# Patient Record
Sex: MALE | Race: WHITE
[De-identification: names, ages, dates, MRNs, and addresses within clinical notes are randomized per-mention and may not be internally consistent; named-entity substitution may affect disease eponyms.]

---

## 2018-12-23 ENCOUNTER — HOSPITAL ENCOUNTER (OUTPATIENT)
Dept: HOSPITAL 97 - ER | Age: 83
Setting detail: OBSERVATION
LOS: 1 days | Discharge: HOME | End: 2018-12-24
Attending: INTERNAL MEDICINE | Admitting: INTERNAL MEDICINE
Payer: COMMERCIAL

## 2018-12-23 DIAGNOSIS — Z79.82: ICD-10-CM
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
## 2018-12-23 DIAGNOSIS — I25.10: ICD-10-CM

## 2018-12-23 DIAGNOSIS — Z95.1: ICD-10-CM

## 2018-12-23 DIAGNOSIS — S70.01XA: Primary | ICD-10-CM

## 2018-12-23 LAB
HCT VFR BLD CALC: 36.6 % (ref 39.6–49)
LYMPHOCYTES # SPEC AUTO: 1.6 K/UL (ref 0.7–4.9)
PMV BLD: 8 FL (ref 7.6–11.3)
RBC # BLD: 3.83 M/UL (ref 4.33–5.43)

## 2018-12-23 PROCEDURE — 87186 SC STD MICRODIL/AGAR DIL: CPT

## 2018-12-23 PROCEDURE — 87088 URINE BACTERIA CULTURE: CPT

## 2018-12-23 PROCEDURE — 81015 MICROSCOPIC EXAM OF URINE: CPT

## 2018-12-23 PROCEDURE — 80048 BASIC METABOLIC PNL TOTAL CA: CPT

## 2018-12-23 PROCEDURE — 81003 URINALYSIS AUTO W/O SCOPE: CPT

## 2018-12-23 PROCEDURE — 85025 COMPLETE CBC W/AUTO DIFF WBC: CPT

## 2018-12-23 PROCEDURE — 72170 X-RAY EXAM OF PELVIS: CPT

## 2018-12-23 PROCEDURE — 87086 URINE CULTURE/COLONY COUNT: CPT

## 2018-12-23 PROCEDURE — 36415 COLL VENOUS BLD VENIPUNCTURE: CPT

## 2018-12-23 PROCEDURE — 99285 EMERGENCY DEPT VISIT HI MDM: CPT

## 2018-12-23 PROCEDURE — 73502 X-RAY EXAM HIP UNI 2-3 VIEWS: CPT

## 2018-12-23 PROCEDURE — 87077 CULTURE AEROBIC IDENTIFY: CPT

## 2018-12-23 NOTE — EDPHYS
Physician Documentation                                                                           

 [facility]                                                                

Name: Farhan Garcia                                                                                

Age: 89 yrs                                                                                       

Sex: Male                                                                                         

: 1929                                                                                   

MRN: [id number]                                                                                   

Arrival Date: 2018                                                                          

Time: 20:16                                                                                       

Account#: [id number]                                                                            

Bed 20                                                                                            

Private MD: CHAVO Galindo C                                                                            

ED Physician Varinder Goel                                                                             

HPI:                                                                                              

                                                                                             

21:12 This 89 yrs old  Male presents to ER via Wheelchair with complaints of Fall    pkl 

      Injury.                                                                                     

21:12 The patient or guardian reports deformity, an injury, pain, swelling. sustained from a  pkl 

      fall, from a standing position, tripped off a curb. The complaints affect the right         

      hip. Onset: The symptoms/episode began/occurred just prior to arrival, 2 hour(s) ago.       

      Associated signs and symptoms: Loss of consciousness: the patient experienced no loss       

      of consciousness.                                                                           

                                                                                                  

Historical:                                                                                       

- Allergies:                                                                                      

20:19 No Known Allergies;                                                                     la1 

- PMHx:                                                                                           

20:19 asbestosis; Hypertension; Osteoporosis;                                                 la1 

- PSHx:                                                                                           

20:19 right hip; CABG; Appendectomy;                                                          la1 

                                                                                                  

- Immunization history:: Adult Immunizations up to date.                                          

- Social history:: Smoking status: Patient/guardian denies using tobacco.                         

- Ebola Screening: : No symptoms or risks identified at this time.                                

                                                                                                  

                                                                                                  

ROS:                                                                                              

21:12 Eyes: Negative for injury, pain, redness, and discharge, ENT: Negative for injury,      pkl 

      pain, and discharge, Neck: Negative for injury, pain, and swelling, Cardiovascular:         

      Negative for chest pain, palpitations, and edema, Respiratory: Negative for shortness       

      of breath, cough, wheezing, and pleuritic chest pain, Abdomen/GI: Negative for              

      abdominal pain, nausea, vomiting, diarrhea, and constipation, Back: Negative for injury     

      and pain, : Negative for injury, bleeding, discharge, and swelling.                       

21:12 MS/extremity: Positive for injury or acute deformity, pain, swelling, tenderness, of        

      the right  hip.                                                                             

21:12 Skin: Negative for rash.                                                                    

21:12 Neuro: Negative for altered mental status, loss of consciousness.                           

                                                                                                  

Exam:                                                                                             

21:12 Head/Face:  Normocephalic, atraumatic. Eyes:  Pupils equal round and reactive to light, pkl 

      extra-ocular motions intact.  Lids and lashes normal.  Conjunctiva and sclera are           

      non-icteric and not injected.  Cornea within normal limits.  Periorbital areas with no      

      swelling, redness, or edema. ENT:  Nares patent. No nasal discharge, no septal              

      abnormalities noted.  Tympanic membranes are normal and external auditory canals are        

      clear.  Oropharynx with no redness, swelling, or masses, exudates, or evidence of           

      obstruction, uvula midline.  Mucous membranes moist. Neck:  Trachea midline, no             

      thyromegaly or masses palpated, and no cervical lymphadenopathy.  Supple, full range of     

      motion without nuchal rigidity, or vertebral point tenderness.  No Meningismus.             

      Chest/axilla:  Normal chest wall appearance and motion.  Nontender with no deformity.       

      No lesions are appreciated. Cardiovascular:  Regular rate and rhythm with a normal S1       

      and S2.  No gallops, murmurs, or rubs.  Normal PMI, no JVD.  No pulse deficits.             

      Respiratory:  Lungs have equal breath sounds bilaterally, clear to auscultation and         

      percussion.  No rales, rhonchi or wheezes noted.  No increased work of breathing, no        

      retractions or nasal flaring. Abdomen/GI:  Soft, non-tender, with normal bowel sounds.      

      No distension or tympany.  No guarding or rebound.  No evidence of tenderness               

      throughout. Back:  No spinal tenderness.  No costovertebral tenderness.  Full range of      

      motion. Skin:  Warm, dry with normal turgor.  Normal color with no rashes, no lesions,      

      and no evidence of cellulitis. Neuro:  Awake and alert, GCS 15, oriented to person,         

      place, time, and situation.  Cranial nerves II-XII grossly intact.  Motor strength 5/5      

      in all extremities.  Sensory grossly intact.  Cerebellar exam normal.  Normal gait.         

21:12 Musculoskeletal/extremity: Extremities: grossly normal except: noted in the right  hip:     

      pain, swelling, tenderness.                                                                 

                                                                                                  

Vital Signs:                                                                                      

20:19  / 66; Pulse 74; Resp 16; Temp 97.5; Pulse Ox 98% on R/A; Weight 88.9 kg; Height  la1 

      5 ft. 8 in. (172.72 cm); Pain 5/10;                                                         

21:00  / 65; Pulse 70; Resp 17; Pulse Ox 99% ;                                          rr5 

22:00  / 71; Pulse 79; Resp 18; Pulse Ox 99% ;                                          rr5 

23:00  / 62; Pulse 79; Resp 17; Pulse Ox 98% ;                                          rr5 

                                                                                             

00:00  / 60; Pulse 77; Resp 17; Temp 97.6; Pulse Ox 98% on R/A;                         rr5 

                                                                                             

20:19 Body Mass Index 29.80 (88.90 kg, 172.72 cm)                                             la1 

                                                                                                  

Barton Coma Score:                                                                               

                                                                                             

20:20 Eye Response: spontaneous(4). Verbal Response: oriented(5). Motor Response: obeys       rr5 

      commands(6). Total: 15.                                                                     

                                                                                                  

Trauma Score (Adult):                                                                             

20:20 Eye Response: spontaneous(1); Verbal Response: oriented(1); Motor Response: obeys       rr5 

      commands(2); Systolic BP: > 89 mm Hg(4); Respiratory Rate: 10 to 29 per min(4); Radha     

      Score: 15; Trauma Score: 12                                                                 

                                                                                                  

MDM:                                                                                              

21:03 Patient medically screened.                                                             pkl 

23:04 Data reviewed: vital signs, nurses notes, lab test result(s), radiologic studies, plain pkl 

      films.                                                                                      

                                                                                                  

                                                                                             

22:45 Order name: CBC with Automated Diff; Complete Time: 02:27                               EDMS

                                                                                             

21:10 Order name: Hip Right 2 View XRAY                                                       pkl 

                                                                                             

21:10 Order name: Pelvis XRAY; Complete Time: 22:50                                           pkl 

                                                                                                  

Administered Medications:                                                                         

No medications were administered                                                                  

                                                                                                  

                                                                                                  

Disposition:                                                                                      

18 23:05 Hospitalization ordered by Deshawn Munoz for Observation. Preliminary            

  diagnosis is Large hematoma right hip. S/P Fall.                                                

- Bed requested for Telemetry/MedSurg (observation).                                              

- Status is Observation.                                                                      rr5 

- Condition is Stable.                                                                            

- Problem is new.                                                                                 

- Symptoms are unchanged.                                                                         

UTI on Admission? No                                                                              

                                                                                                  

                                                                                                  

                                                                                                  

Signatures:                                                                                       

Dispatcher [employer]                           EDMS                                                 

Varinder Goel MD MD   pkl                                                  

Mathew Payan RN                         RN   la1                                                  

Becky Wallace RN                       RN                                                      

Peña Vogel RN                      RN   rr5                                                  

                                                                                                  

Corrections: (The following items were deleted from the chart)                                    

23:51 23:05 Hospitalization Ordered by Deshawn Munoz MD for Observation. Preliminary         cg  

      diagnosis is Large hematoma right hip. S/P Fall. Bed requested for Telemetry/MedSurg        

      (observation). Status is Observation. Condition is Stable. Problem is new. Symptoms are     

      unchanged. UTI on Admission? No. pkl                                                        

                                                                                             

00:59  23:51 2018 23:05 Hospitalization Ordered by Deshawn Munoz MD for           rr5 

      Observation. Preliminary diagnosis is Large hematoma right hip. S/P Fall. Bed requested     

      for Telemetry/MedSurg (observation). Status is Observation. Condition is Stable.            

      Problem is new. Symptoms are unchanged. UTI on Admission? No. cg                            

                                                                                                  

**************************************************************************************************

## 2018-12-23 NOTE — XMS REPORT
Patient Summary Document

 Created on:2018



Patient:BRAXTON KHAN

Sex:Male

:1929

External Reference #:[id number]





Demographics







 Address  [address] [place] [id number]

 

 Home Phone  [phone]

 

 Preferred Language  Unknown

 

 Marital Status  Unknown

 

 Muslim Affiliation  Unknown

 

 Race  Unknown

 

 Additional Race(s)  Unavailable

 

 Ethnic Group  Unknown









Author







 Organization  [facility]

 

 Address  [address] [place] [address] [address]



   [place] [address]

 

 Phone  [phone]









Care Team Providers







 Name  Role  Phone

 

 LEX DOBBS  Unavailable  Unavailable









Problems

This patient has no known problems.



Allergies, Adverse Reactions, Alerts

This patient has no known allergies or adverse reactions.



Medications

This patient has no known medications.



Results







 Test Description  Test Time  Test Comments  Text Results  Atomic Results  
Result Comments









 URINALYSIS W/ REFLEX URINE CULTURE  2017-10-27 15:25:00    









   Test Item  Value  Reference Range  Comments









 COLOR (BEAKER) (test code=470)  Light Yellow    

 

 CLARITY (BEAKER) (test code=469)  Clear    

 

 SPECIFIC GRAVITY UA (BEAKER) (test code=468)  1.004  1.001-1.035  

 

 PH UA (BEAKER) (test code=467)  7.0  5.0-8.0  

 

 PROTEIN UA (BEAKER) (test code=464)  Negative  Negative  

 

 GLUCOSE UA (BEAKER) (test code=365)  Negative  Negative  

 

 KETONES UA (BEAKER) (test code=371)  Negative  Negative  

 

 BILIRUBIN UA (BEAKER) (test code=462)  Negative  Negative  

 

 BLOOD UA (BEAKER) (test code=461)  Moderate  Negative  

 

 NITRITE UA (BEAKER) (test code=465)  Negative  Negative  

 

 LEUKOCYTE ESTERASE UA (BEAKER) (test code=466)  Large  Negative  

 

 UROBILINOGEN UA (BEAKER) (test code=463)  0.2 mg/dL  0.2-1.0  

 

 RBC UA (BEAKER) (test code=519)  16 /HPF    

 

 WBC UA (BEAKER) (test code=520)  30 /HPF    

 

 MUCUS (BEAKER) (test code=1574)  Rare    

 

 SOURCE(BEAKER) (test code=2791)

## 2018-12-23 NOTE — ER
Nurse's Notes                                                                                     

 [facility]                                                                

Name: Farhan Garcia                                                                                

Age: 89 yrs                                                                                       

Sex: Male                                                                                         

: 1929                                                                                   

MRN: [id number]                                                                                   

Arrival Date: 2018                                                                          

Time: 20:16                                                                                       

Account#: [id number]                                                                            

Bed 20                                                                                            

Private MD: CHAVO Galindo C                                                                            

Diagnosis: Large hematoma right hip. S/P Fall                                                     

                                                                                                  

Presentation:                                                                                     

                                                                                             

20:17 Presenting complaint: Patient states: About 2 hours ago I tripped down a curb and       la1 

      landed on my right hip, I have had it replaced in the past and it is very swollen and       

      painful. Transition of care: patient was not received from another setting of care.         

      Onset of symptoms was 2018. Risk Assessment: Do you want to hurt yourself      

      or someone else? Patient reports no desire to harm self or others. Initial Sepsis           

      Screen: Does the patient meet any 2 criteria? No. Patient's initial sepsis screen is        

      negative. Does the patient have a suspected source of infection? No. Patient's initial      

      sepsis screen is negative. Care prior to arrival: None.                                     

20:17 Method Of Arrival: Wheelchair                                                           la1 

20:17 Acuity: ANGELA 3                                                                           la1 

20:17 Mechanism of Injury: Fall.                                                              rr5 

                                                                                                  

Historical:                                                                                       

- Allergies:                                                                                      

20:19 No Known Allergies;                                                                     la1 

- PMHx:                                                                                           

20:19 asbestosis; Hypertension; Osteoporosis;                                                 la1 

- PSHx:                                                                                           

20:19 right hip; CABG; Appendectomy;                                                          la1 

                                                                                                  

- Immunization history:: Adult Immunizations up to date.                                          

- Social history:: Smoking status: Patient/guardian denies using tobacco.                         

- Ebola Screening: : No symptoms or risks identified at this time.                                

                                                                                                  

                                                                                                  

Screenin:20 Abuse screen: Denies threats or abuse. Denies injuries from another. Nutritional        rr5 

      screening: No deficits noted. Tuberculosis screening: No symptoms or risk factors           

      identified.                                                                                 

21:20 Fall Risk Fall in past 12 months (25 points). Secondary diagnosis (15 points) impaired  rr5 

      mobility, IV access (20 points). Total Goldsmith Fall Scale indicates High Risk Score (45       

      or more points). Fall prevention measures have been instituted. Side Rails Up X 2           

      Frequent Obs/Assessments Occuring Family Present and informed to notify staff if the        

      need to leave the bedside As available patient and family educated on Fall Prevention       

      Program and Strategies.                                                                     

                                                                                                  

Primary Survey:                                                                                   

20:20 NO uncontrolled hemorrhage observed. A: The patient is alert. Airway:.                  rr5 

20:20 Breathing/Chest: Respiratory pattern: regular, Respiratory effort: spontaneous,         rr5 

      unlabored. Circulation: Heart tones present. Pulses: palpable right popliteal artery,       

      right posterior tibial artery and right dorsalis pedis artery. Disability Alert.            

      Exposure/Environment: All clothing and personal items were removed. Forensic evidence       

      collection is not deemed to be indicated at this time. Items placed in patient              

      belonging bag.                                                                              

21:00 Reassessment Airway Airway Patent Breathing/Chest Respiratory pattern Regular           rr5 

      Respiratory effort Spontaneous Unlabored Circulation Pulses Palpable Color Pink             

      Disability Alert.                                                                           

21:00 Reassessment Airway Airway Patent Breathing/Chest Respiratory pattern Regular           rr5 

      Respiratory effort Spontaneous Unlabored Circulation Pulses Palpable Color Pink             

      Disability Alert.                                                                           

                                                                                                  

Secondary Survey:                                                                                 

20:20 HEENT: No deficits noted. Gastrointestinal: No deficits noted. : No deficits noted.   rr5 

      Musculoskeletal: Swelling present in right hip.                                             

                                                                                                  

Assessment:                                                                                       

20:17 General: Appears in no apparent distress. uncomfortable, Behavior is calm, cooperative, rr5 

      appropriate for age.                                                                        

20:17 Pain: Complains of pain in right hip Pain does not radiate. Pain currently is 4 out of  rr5 

      10 on a pain scale. Quality of pain is described as aching, Pain began suddenly, Is         

      intermittent. Neuro: Level of Consciousness is awake, alert, obeys commands, Oriented       

      to person, place, time, situation. Cardiovascular: Capillary refill < 3 seconds             

      Patient's skin is warm and dry. Respiratory: Airway is patent Respiratory effort is         

      even, unlabored, Respiratory pattern is regular, symmetrical. GI: Abdomen is round. :     

      No signs and/or symptoms were reported regarding the genitourinary system. EENT: No         

      signs and/or symptoms were reported regarding the EENT system. Derm: Bruising that is       

      dark purple, on right arm and left arm. Musculoskeletal: Swelling present in right hip.     

21:30 Reassessment: Patient appears in no apparent distress at this time. Patient and/or      rr5 

      family updated on plan of care and expected duration. Pain level reassessed. Patient        

      states symptoms have improved.                                                              

22:45 Reassessment: Patient appears in no apparent distress at this time. Patient and/or      rr5 

      family updated on plan of care and expected duration. Pain level reassessed. blood          

      extracted. no complaints made. dr. su explained the plan of admission.                     

23:05 Reassessment: dr. pinto came, assess and examined the patient.                       rr5 

                                                                                             

00:05 Reassessment: Patient appears in no apparent distress at this time. no complaints made, rr5 

      chatting with his  Patient states feeling better. Patient states symptoms have     

      improved.                                                                                   

                                                                                                  

Vital Signs:                                                                                      

                                                                                             

20:19  / 66; Pulse 74; Resp 16; Temp 97.5; Pulse Ox 98% on R/A; Weight 88.9 kg; Height  la1 

      5 ft. 8 in. (172.72 cm); Pain 5/10;                                                         

21:00  / 65; Pulse 70; Resp 17; Pulse Ox 99% ;                                          rr5 

22:00  / 71; Pulse 79; Resp 18; Pulse Ox 99% ;                                          rr5 

23:00  / 62; Pulse 79; Resp 17; Pulse Ox 98% ;                                          rr5 

                                                                                             

00:00  / 60; Pulse 77; Resp 17; Temp 97.6; Pulse Ox 98% on R/A;                         rr5 

                                                                                             

20:19 Body Mass Index 29.80 (88.90 kg, 172.72 cm)                                             la1 

                                                                                                  

Radha Coma Score:                                                                               

                                                                                             

20:20 Eye Response: spontaneous(4). Verbal Response: oriented(5). Motor Response: obeys       rr5 

      commands(6). Total: 15.                                                                     

                                                                                                  

Trauma Score (Adult):                                                                             

20:20 Eye Response: spontaneous(1); Verbal Response: oriented(1); Motor Response: obeys       rr5 

      commands(2); Systolic BP: > 89 mm Hg(4); Respiratory Rate: 10 to 29 per min(4); Anna     

      Score: 15; Trauma Score: 12                                                                 

                                                                                                  

ED Course:                                                                                        

20:16 Patient arrived in ED.                                                                  mr  

20:16 CHAVO Galindo MD is Private Physician.                                                       mr  

20:18 Triage completed.                                                                       la1 

20:19 Arm band placed on right wrist.                                                         la1 

20:20 Pulse ox on. NIBP on.                                                                   rr5 

20:20 Patient has correct armband on for positive identification. Placed in gown. Bed in low  rr5 

      position. Call light in reach. Side rails up X2.                                            

21:03 Varinder Su MD is Attending Physician.                                                    pkl 

21:27 Peña Vogel, RN is Primary Nurse.                                                    rr5 

21:41 Hip Right 2 View XRAY In Process Unspecified.                                           EDMS

21:41 Pelvis XRAY In Process Unspecified.                                                     EDMS

22:00 Patient maintains SpO2 saturation greater than 95% on room air.                         rr5 

22:30 No provider procedures requiring assistance completed.                                  rr5 

23:04 Deshawn Munoz MD is Hospitalizing Provider.                                          pkl 

23:23 CBC with Automated Diff Sent.                                                           rr5 

23:23 Inserted saline lock: 20 gauge in right forearm, using aseptic technique. Blood         rr5 

      collected.                                                                                  

                                                                                             

00:11 Patient admitted, IV remains in place. intact.                                          rr5 

                                                                                                  

Administered Medications:                                                                         

No medications were administered                                                                  

                                                                                                  

                                                                                                  

Output:                                                                                           

                                                                                             

23:30 Urine: 400ml (Voided); Total: 400ml.                                                    rr5 

                                                                                                  

Outcome:                                                                                          

23:05 Decision to Hospitalize by Provider.                                                    pkl 

23:05 Patient's length of stay in the Emergency Department was greater than 2 hours. for      rr5 

      admission. waiting for attending physician approval.Patient's length of stay extended       

      due to                                                                                      

                                                                                             

00:10 Admitted to Tele accompanied by tech, via stretcher, with chart, Report called to       rr5 

      zeferino                                                                                       

      Condition: stable                                                                           

      Instructed on the need for admit.                                                           

00:59 Patient left the ED.                                                                    rr5 

                                                                                                  

Signatures:                                                                                       

Dispatcher [employer]                           EDVarinder Wood MD MD pkl Rivera, Mary                                 mr                                                   

Mathew Payan, RN                         RN   la1                                                  

Peña Vogel, RN                      RN   rr5                                                  

                                                                                                  

**************************************************************************************************

## 2018-12-23 NOTE — RAD REPORT
EXAM DESCRIPTION:  RAD - Pelvis - 12/23/2018 9:41 pm

 

CLINICAL HISTORY:  fall

Trauma, fall, right hip pain

 

COMPARISON:  None

 

FINDINGS:  AP pelvis and right hip-multiple projections are submitted.

 

Right total hip arthroplasty is noted. Large amount of soft tissue swelling is seen about the right h
ip. An acute fracture is not identified.

## 2018-12-23 NOTE — XMS REPORT
Clinical Summary

 Created on:2018



Patient:Farhan Garcia

Sex:Male

:1929

External Reference #:[id number]





Demographics







 Address  [address] [place] [address]

 

 Home Phone  [phone]

 

 Preferred Language  English

 

 Marital Status  Unknown

 

 Mandaen Affiliation  Unknown

 

 Race  White

 

 Ethnic Group  Not  or 









Author







 Organization  [facility]

 

 Address  [address] [place] [address]









Support







 Name  Relationship  Address  Phone

 

 Farhan Garcia  Unavailable  [address] [facility]  [phone]



     [place] [address]  









Care Team Providers







 Name  Role  Phone

 

 Vangie  Primary Care Provider  [phone]









Allergies

No Known Allergies



Medications

Not on file



Active Problems

Not on file



Social History







 Tobacco Use  Types  Packs/Day  Years Used  Date

 

 Never Assessed        









 Sex Assigned at Birth  Date Recorded

 

 Not on file  









 Job Start Date  Occupation  Industry

 

 Not on file  Not on file  Not on file









 Travel History  Travel Start  Travel End









 No recent travel history available.







Last Filed Vital Signs

Not on file



Plan of Treatment

Not on file



Results

Not on fileafter 2017



Insurance







 Payer  Benefit Plan /  Subscriber ID  Type  Phone  Address



   Group        

 

 [employer] - MEDICARE  [employer] O  [address]    [phone]  [address]







 [employer]  POS PPO        [place]



           [address]









 Guarantor Name  Account Type  Relation to  Date of  Phone  Billing Address



     Patient  Birth    

 

 Farhan Garcia  Personal/Famil  Self  1929  [phone]  [address] [place] [address]  [place]      (Home)  [place] [address]

## 2018-12-24 LAB
BUN BLD-MCNC: 17 MG/DL (ref 7–18)
GLUCOSE SERPLBLD-MCNC: 108 MG/DL (ref 74–106)
HCT VFR BLD CALC: 33.2 % (ref 39.6–49)
LYMPHOCYTES # SPEC AUTO: 1.6 K/UL (ref 0.7–4.9)
PMV BLD: 8 FL (ref 7.6–11.3)
POTASSIUM SERPL-SCNC: 4.2 MMOL/L (ref 3.5–5.1)
RBC # BLD: 3.48 M/UL (ref 4.33–5.43)
UA COMPLETE W REFLEX CULTURE PNL UR: (no result)
UA DIPSTICK W REFLEX MICRO PNL UR: (no result)

## 2018-12-24 NOTE — CON
Date of Consultation:  12/24/2018



Brief History Of Present Illness:  The patient is an 89-year-old  male, who has a history of
 coronary artery disease and a right hip replacement many years ago, who fell earlier yesterday over 
the area of the right hip.  Immediately, he started feeling pain and so noticed progressive swelling 
in the area and it had gotten somewhat bruised in appearance.  He has been taking an 81 mg aspirin, b
ut no other anticoagulation.  He was seen in the ER with the significant amount of swelling and pain 
to this area.  Ultimately, his hemoglobin was 12.3 on check and he had no additional distal complaint
s with respect to numbness, tingling, or any sensory or motor deficits in that same said extremity.



Past Medical History:  Coronary artery disease, asbestosis, osteoporosis of right hip.



Past Surgical History:  Right hip arthroscopy, CABG, and an appendectomy.



Family History:  Reviewed and noncontributory.



Social History:  He denies smoking, alcohol, recreational drug use.



Allergies:  NO KNOWN DRUG ALLERGIES.



Home Medications:  Include aspirin 81 mg, atorvastatin, diltiazem, Colace, Lasix, Combivent, ________
__ probiotic, Linzess, melatonin, MiraLax, Folgard, and Ultram.



Review of Systems:

A 10-point review of systems other than HPI, denies.



Physical Examination:

Vital Signs:  At the time of my examination, his BMI is 29.8.  Blood pressure 128/56, respiratory rat
e is 20, pulse is 81, temperature 97.3. 

General:  He is awake, alert, and oriented. 

Psychiatric:  He is appropriately conversive. 

HEENT:  Normocephalic.  Sclerae icteric.  Mucous membranes are moist.  Oropharynx clear. 

Neck:  Supple.  No JVD. 

Chest:  Normal expansion and excursion. 

Cardiovascular:  Regular rate and rhythm.  Midline sternotomy scar. 

Pulmonary:  Clear to auscultation bilaterally. 

Abdomen:  Soft, nontender, and nondistended. 

Extremities:  Focused examination of the right lower extremity shows a large hematoma in the right hi
p area, but it appears stable at this time.  It has minimal tenderness to the area.  There is no skin
 compromise or elevated cellulitis.  There is some bruising and ecchymotic changes to the area.  The 
remainder of the extremity examination shows no sensorineural deficits. 

Skin:  Warm and dry, otherwise.



Laboratory Data:  Reveals a white blood count of 6.3, hemoglobin is 11.3 down from 12.3 on admission,
 his hematocrit is 33.2, his platelets are 160, neutrophils are normal at 59%.  His sodium 138, potas
sium 4.2, chloride 102, carbon dioxide 32, BUN 17, creatinine 0.6, glucose is 108, calcium 8.2.  His 
UA showed positive nitrites, trace leukocyte esterase, 5-10 white blood cells, and loaded bacteria.  
He had imaging performed which included a pelvis x-ray, which was officially read as right total hip 
arthroscopy noted, large amount of soft tissue swelling seen about the right hip.  An acute fracture 
is not identified.



Assessment And Plan:  This is an 89-year-old male who is status post fall with a likely hematoma of t
he right hip area.  It appears stable at this time.  His hemoglobin has not significantly dropped sin
ce admission and with the IV fluids he has received this is likely dilutional.  As such, I recommend 
ice packs and wrapping with a loose Ace bandage pelvic binder and continued outpatient followup.  I h
ave explained the risks, benefits, and alternatives of the above stated plan.  The patient agrees to 
proceed as indicated.  Hold all anticoagulation for 7 days and then resume his 81 mg aspirin at that 


point.  He can follow up with me in the clinic as an outpatient.  We have discussed this as well.





ASTRID/AMMY

DD:  12/24/2018 13:48:11Voice ID:  [id number]

DT:  12/24/2018 18:03:25Report ID:  [id number]

## 2018-12-24 NOTE — P.HP
Certification for Inpatient


Patient admitted to: Observation


With expected LOS: <2 Midnights


Practitioner: I am a practitioner with admitting privileges, knowledge of 

patient current condition, hospital course, and medical plan of care.


Services: Services provided to patient in accordance with Admission 

requirements found in Title 42 Section 412.3 of the Code of Federal Regulations





Patient History


Date of Service: 12/23/18


Reason for admission: right hip hematoma


History of Present Illness: 





Mr Garcia is an 89 years old male with history of CAD, right hip replacement, 

who fell this afternoon, over his right hip. immediately, he start feeling pain

, and noticed progressive swelling in the area. He is taking baby aspirin but 

not anticoagulants. In ER XR was remarkable for large amount of soft tissue 

swelling, no fractures or dislocation. Hgb 12.3. He has significant swelling 

area on his right hip. The patient denied numbness or tingling in his distal 

lower extremity.


Allergies





No Known Aller Allergy (Uncoded 09/05/17 17:32)


 Unknown





Home medications list reviewed: Yes





- Past Medical/Surgical History


-: CAD


-: asbestosis


-: osteoporosis


-: Right hip arthroscopy


-: CABG


-: appex





- Family History


Family History: Reviewed- Non-Contributory





- Social History


Alcohol use: No


CD- Drugs: No


Place of Residence: Home





Review of Systems


10-point ROS is otherwise unremarkable





Physical Examination





- Physical Exam


General: Alert, In no apparent distress


HEENT: Atraumatic, PERRLA, Mucous membr. moist/pink, EOMI, Sclerae nonicteric


Neck: Supple, 2+ carotid pulse no bruit, No LAD, Without JVD or thyroid 

abnormality


Respiratory: Clear to auscultation bilaterally, Normal air movement


Cardiovascular: Regular rate/rhythm, Normal S1 S2


Gastrointestinal: Normal bowel sounds, No tenderness


Musculoskeletal: Swelling (right hip), Tenderness (right hip)


Integumentary: No rashes


Neurological: Normal speech, Normal strength at 5/5 x4 extr, Normal tone, 

Normal affect


Lymphatics: No axilla or inguinal lymphadenopathy





- Studies


Laboratory Data (last 24 hrs)





12/23/18 23:00: WBC 9.3, Hgb 12.3 L, Hct 36.6 L, Plt Count 170








Assessment and Plan





- Problems (Diagnosis)


(1) Hip hematoma, right


Current Visit: Yes   Status: Acute   


Qualifiers: 


   Encounter type: initial encounter   Qualified Code(s): S70.01XA - Contusion 

of right hip, initial encounter   





(2) CAD (coronary artery disease)


Current Visit: Yes   Status: Acute   


Qualifiers: 


   Coronary Disease-Associated Artery/Lesion type: bypass graft   Native vs. 

transplanted heart: native heart   Associated angina: without angina   

Qualified Code(s): I25.810 - Atherosclerosis of coronary artery bypass graft(s) 

without angina pectoris   





- Plan





The patient will be admitted to the hospital under observation to evaluate 

progress of hematoma size. Will check HH regularly, consult Dr Fletcher for 

evaluation and recommendations.





- Advance Directives


Does patient have a Living Will: No


Does patient have a Durable POA for Healthcare: No





- Code Status/Comfort Care


Code Status Assessed: Yes


Code Status: Full Code

## 2018-12-24 NOTE — P.SSS
Patient History


Date of Service: 12/24/18


Reason for admission: right hip hematoma


History of Present Illness: 





Mr Garcia is an 89 years old male with history of CAD, right hip replacement, 

who fell this afternoon, over his right hip. immediately, he start feeling pain

, and noticed progressive swelling in the area. He is taking baby aspirin but 

not anticoagulants. In ER XR was remarkable for large amount of soft tissue 

swelling, no fractures or dislocation. Hgb 12.3. He has significant swelling 

area on his right hip. The patient denied numbness or tingling in his distal 

lower extremity.


Allergies





No Known Drug Allergies Allergy (Verified 12/24/18 02:35)


 Unknown





Home Medications: 








Aspirin Chewable [Aspirin Chewable*] 81 mg PO DAILY 12/24/18 


Atorvastatin Calcium [Lipitor*] 20 mg PO BEDTIME 12/24/18 


Diltiazem HCl [Cartia Xt] 120 mg PO BID 12/24/18 


Docusate [Colace Cap*] 100 mg PO DAILY 12/24/18 


Furosemide [Lasix*] 40 mg PO DAILY 12/24/18 


Ipratropium/Albuterol Sulfate [Combivent Respimat  Mcg] 1 puff IH QID PRN 

12/24/18 


L. Rhamnosus GG/Inulin [Culturelle Probiotics Capsule] 1 each PO DAILY 12/24/18 


Linaclotide [Linzess] 1 cap PO DAILY PRN 12/24/18 


Melatonin/Pyridoxine [Melatonin 5 mg Tablet] 1 each PO BEDTIME 12/24/18 


Polyethylene Glycol 3350 [Miralax] 17 gm PO DAILY PRN 12/24/18 


Vit D3/Folic Acid/B2/B6/B12 [Folgard Tablet] 1 each PO DAILY 12/24/18 


traMADol HCL [Ultram*] 50 mg PO Q6H PRN #15 tab 12/24/18 








- Past Medical/Surgical History


Has patient received pneumonia vaccine in the past: No


Diabetic: No


-: CAD


-: asbestosis


-: osteoporosis


-: Right hip arthroscopy


-: CABG


-: appex





- Family History


Family History: Reviewed- Non-Contributory





- Family History


  ** Mother


-: Heart disease





  ** Brother


-: Heart disease, Diabetes





- Social History


Smoking Status: Former smoker


Alcohol use: No


CD- Drugs: No


Caffeine use: Yes


Place of Residence: Home





Review of Systems


10-point ROS is otherwise unremarkable





Physical Examination





- Vital Signs


Temperature: 97.3 F


Blood Pressure: 128/56


Pulse: 81


Respirations: 20


Pulse Ox (%): 91





- Physical Exam


General: Alert, In no apparent distress


HEENT: Atraumatic, PERRLA, Mucous membr. moist/pink, EOMI, Sclerae nonicteric


Neck: Supple, 2+ carotid pulse no bruit, No LAD, Without JVD or thyroid 

abnormality


Respiratory: Clear to auscultation bilaterally, Normal air movement


Cardiovascular: Regular rate/rhythm, Normal S1 S2


Gastrointestinal: Normal bowel sounds, No tenderness


Musculoskeletal: No tenderness


Integumentary: No rashes


Neurological: Normal gait, Normal speech, Normal strength at 5/5 x4 extr, 

Normal tone, Normal affect


Lymphatics: No axilla or inguinal lymphadenopathy





- Studies


Laboratory Data (last 24 hrs)





12/23/18 23:00: WBC 9.3, Hgb 12.3 L, Hct 36.6 L, Plt Count 170





Treatment Summary: 





Overall during the hospital stay patient remained stable





Patient was initially admitted to the hospital for right hip hematoma after 

having a fall at the old.  From surgery was consulted who recommended that 

patient can be observed for 24 hr.  Patient was observed here in the hospital 

for 24 hr and was discharged home under stable condition.  Patient is to 

continue using the Ace wrap around the affected area.  Patient was asked to 

follow up with the primary care provider in 1-2 days post discharge and thus he 

was discharged home under stable condition





- Disposition


Disposition: ROUTINE DISCHARGE


Condition: GOOD


Diet: Regular


Activity: Ad reji

## 2018-12-26 NOTE — RAD REPORT
EXAM DESCRIPTION:  RAD - Hip Right 2 View - 12/23/2018 9:41 pm

 

CLINICAL HISTORY:  Fall

Trauma, fall, right hip pain

 

COMPARISON:  None

 

FINDINGS:  AP pelvis and right hip-multiple projections are submitted.

 

Right total hip arthroplasty is noted. Large amount of soft tissue swelling is seen about the right h
ip. An acute fracture is not identified.